# Patient Record
Sex: MALE | Race: WHITE | Employment: OTHER | URBAN - METROPOLITAN AREA
[De-identification: names, ages, dates, MRNs, and addresses within clinical notes are randomized per-mention and may not be internally consistent; named-entity substitution may affect disease eponyms.]

---

## 2018-08-12 ENCOUNTER — HOSPITAL ENCOUNTER (EMERGENCY)
Age: 48
Discharge: HOME | End: 2018-08-12
Attending: EMERGENCY MEDICINE

## 2018-08-12 VITALS
DIASTOLIC BLOOD PRESSURE: 70 MMHG | SYSTOLIC BLOOD PRESSURE: 132 MMHG | TEMPERATURE: 98.5 F | HEIGHT: 74 IN | HEART RATE: 74 BPM | OXYGEN SATURATION: 97 % | WEIGHT: 235 LBS | RESPIRATION RATE: 16 BRPM | BODY MASS INDEX: 30.16 KG/M2

## 2018-08-12 DIAGNOSIS — T15.92XA FOREIGN BODY OF LEFT EYE, INITIAL ENCOUNTER: Primary | ICD-10-CM

## 2018-08-12 PROCEDURE — 6370000000 HC RX 637 (ALT 250 FOR IP)

## 2018-08-12 PROCEDURE — 90715 TDAP VACCINE 7 YRS/> IM: CPT

## 2018-08-12 PROCEDURE — 6360000002 HC RX W HCPCS

## 2018-08-12 PROCEDURE — 90471 IMMUNIZATION ADMIN: CPT

## 2018-08-12 PROCEDURE — 99283 EMERGENCY DEPT VISIT LOW MDM: CPT

## 2018-08-12 RX ORDER — TETRACAINE HYDROCHLORIDE 5 MG/ML
2 SOLUTION OPHTHALMIC ONCE
Status: COMPLETED | OUTPATIENT
Start: 2018-08-12 | End: 2018-08-12

## 2018-08-12 RX ORDER — NAPROXEN 500 MG/1
500 TABLET ORAL 2 TIMES DAILY
Qty: 14 TABLET | Refills: 0 | Status: SHIPPED | OUTPATIENT
Start: 2018-08-12 | End: 2018-08-19

## 2018-08-12 RX ORDER — ERYTHROMYCIN 5 MG/G
OINTMENT OPHTHALMIC
Qty: 1 TUBE | Refills: 0 | Status: SHIPPED | OUTPATIENT
Start: 2018-08-12 | End: 2018-08-22

## 2018-08-12 RX ORDER — TETRACAINE HYDROCHLORIDE 5 MG/ML
SOLUTION OPHTHALMIC
Status: COMPLETED
Start: 2018-08-12 | End: 2018-08-12

## 2018-08-12 RX ADMIN — TETRACAINE HYDROCHLORIDE 2 DROP: 5 SOLUTION OPHTHALMIC at 21:26

## 2018-08-12 RX ADMIN — TETANUS TOXOID, REDUCED DIPHTHERIA TOXOID AND ACELLULAR PERTUSSIS VACCINE, ADSORBED 0.5 ML: 5; 2.5; 8; 8; 2.5 SUSPENSION INTRAMUSCULAR at 21:23

## 2018-08-12 RX ADMIN — FLUORESCEIN SODIUM 1 MG: 1 STRIP OPHTHALMIC at 21:22

## 2018-08-12 ASSESSMENT — PAIN DESCRIPTION - LOCATION: LOCATION: EYE

## 2018-08-12 ASSESSMENT — PAIN DESCRIPTION - DESCRIPTORS: DESCRIPTORS: ACHING;NAGGING

## 2018-08-12 ASSESSMENT — VISUAL ACUITY
OU: 20/40
OD: 20/70
OS: 20/50

## 2018-08-12 ASSESSMENT — PAIN DESCRIPTION - PAIN TYPE: TYPE: ACUTE PAIN

## 2018-08-12 ASSESSMENT — PAIN DESCRIPTION - FREQUENCY: FREQUENCY: CONTINUOUS

## 2018-08-12 ASSESSMENT — PAIN DESCRIPTION - ORIENTATION: ORIENTATION: LEFT

## 2018-08-12 ASSESSMENT — PAIN SCALES - GENERAL: PAINLEVEL_OUTOF10: 4

## 2018-08-13 NOTE — ED NOTES
Presents with possible piece of wood to left eye .  Pt was cutting wood yesterday without safety goggles     Jaspal Fuller RN  08/12/18 2035
